# Patient Record
Sex: FEMALE | Race: WHITE | Employment: STUDENT | URBAN - METROPOLITAN AREA
[De-identification: names, ages, dates, MRNs, and addresses within clinical notes are randomized per-mention and may not be internally consistent; named-entity substitution may affect disease eponyms.]

---

## 2017-06-18 ENCOUNTER — HOSPITAL ENCOUNTER (OUTPATIENT)
Age: 22
Discharge: HOME | End: 2017-06-18
Attending: EMERGENCY MEDICINE
Payer: COMMERCIAL

## 2017-06-18 VITALS
SYSTOLIC BLOOD PRESSURE: 110 MMHG | HEART RATE: 82 BPM | TEMPERATURE: 98 F | WEIGHT: 145 LBS | BODY MASS INDEX: 20.76 KG/M2 | HEIGHT: 70 IN | OXYGEN SATURATION: 99 % | DIASTOLIC BLOOD PRESSURE: 73 MMHG | RESPIRATION RATE: 16 BRPM

## 2017-06-18 DIAGNOSIS — L03.116 CELLULITIS OF LEFT LOWER EXTREMITY: ICD-10-CM

## 2017-06-18 DIAGNOSIS — L08.9 SKIN PUSTULE: Primary | ICD-10-CM

## 2017-06-18 PROCEDURE — 99213 OFFICE O/P EST LOW 20 MIN: CPT

## 2017-06-18 PROCEDURE — 99204 OFFICE O/P NEW MOD 45 MIN: CPT

## 2017-06-18 RX ORDER — DOXYCYCLINE HYCLATE 100 MG
100 TABLET ORAL 2 TIMES DAILY
Qty: 14 TABLET | Refills: 0 | Status: SHIPPED | OUTPATIENT
Start: 2017-06-18 | End: 2017-06-25

## 2017-06-18 NOTE — ED PROVIDER NOTES
Patient Seen in: [facility] Immediate Care In [address] [place] [address]    History   Patient presents with:  Bite Sting,Insect (integumentary)    Stated Complaint: skin problem    HPI  2 days ago patient noticed a red tiny bump in her anterior shin.   Patient berry Current:/73 mmHg  Pulse 82  Temp(Src) 98.3 °F (36.8 °C) (Oral)  Resp 16  Ht 177.8 cm (5' 10\")  Wt 65.772 kg  BMI 20.81 kg/m2  SpO2 99%  LMP 06/16/2017        Physical Exam   Constitutional: She is oriented to person, place, and time.  She appears wel Follow up with your doctor is important      Medications Prescribed:  Current Discharge Medication List    START taking these medications    Doxycycline Hyclate 100 MG Oral Tab  Take 1 tablet (100 mg total) by mouth 2 (two) times daily.   Qty: 14 tablet Ref

## 2017-06-18 NOTE — ED NOTES
Warm soak to area- scab removed white material expressed by Dr. Samayoa More prescriptions givnen and reviewed no sun while on medication follow up with pcp in one week or go to the ed on [place]

## 2017-06-18 NOTE — ED INITIAL ASSESSMENT (HPI)
In [place] 1 1/2 weeks ago  -2 days ago noticed aND DISCOLORED LESION ON LEFT MID SHIN THAT IS GROWING AND RED. WARM. PAINFUL. ? FORIEGN BODY NOTED. ? BUG IN WOUND?? Guillermo Diaz Warm redness outlined.

## (undated) NOTE — ED AVS SNAPSHOT
[facility] Immediate Care in [place] [address].  [place] [address]    Phone:  [phone]    Fax:  Jeffrey   MRN: [id number]    Department:  [facility] Im[facility] in [address] [place] [address]   Date of Visit: Discharge References/Attachments     CELLULITIS, DISCHARGE INSTRUCTIONS FOR (ENGLISH)      Disclosure     Insurance plans vary and the physician(s) referred by the Immediate Care may not be covered by your plan.   It is possible that the physician may not IF THERE IS ANY CHANGE OR WORSENING OF YOUR CONDITION, CALL YOUR PRIMARY CARE PHYSICIAN AT ONCE OR GO TO THE EMERGENCY DEPARTMENT.     If you have been prescribed any medication(s), please fill your prescription right away and begin taking the medication(s) you to explore options for quitting.     - If you have concerns related to behavioral health issues or thoughts of harming yourself, contact [facility] at [phone].     - If you don’t have insurance, Josefina De Paz